# Patient Record
Sex: MALE | Race: WHITE | URBAN - METROPOLITAN AREA
[De-identification: names, ages, dates, MRNs, and addresses within clinical notes are randomized per-mention and may not be internally consistent; named-entity substitution may affect disease eponyms.]

---

## 2023-06-29 ENCOUNTER — OFFICE VISIT (OUTPATIENT)
Dept: URBAN - METROPOLITAN AREA CLINIC 54 | Facility: CLINIC | Age: 23
End: 2023-06-29

## 2023-06-30 ENCOUNTER — DASHBOARD ENCOUNTERS (OUTPATIENT)
Age: 23
End: 2023-06-30

## 2023-06-30 ENCOUNTER — OFFICE VISIT (OUTPATIENT)
Dept: URBAN - METROPOLITAN AREA CLINIC 54 | Facility: CLINIC | Age: 23
End: 2023-06-30
Payer: COMMERCIAL

## 2023-06-30 ENCOUNTER — WEB ENCOUNTER (OUTPATIENT)
Dept: URBAN - METROPOLITAN AREA CLINIC 54 | Facility: CLINIC | Age: 23
End: 2023-06-30

## 2023-06-30 VITALS
HEIGHT: 70 IN | SYSTOLIC BLOOD PRESSURE: 144 MMHG | TEMPERATURE: 97.1 F | BODY MASS INDEX: 28.2 KG/M2 | WEIGHT: 197 LBS | DIASTOLIC BLOOD PRESSURE: 77 MMHG | HEART RATE: 79 BPM

## 2023-06-30 DIAGNOSIS — R11.2 NAUSEA AND VOMITING, UNSPECIFIED VOMITING TYPE: ICD-10-CM

## 2023-06-30 DIAGNOSIS — R11.10 REGURGITATION OF FOOD: ICD-10-CM

## 2023-06-30 PROCEDURE — 99204 OFFICE O/P NEW MOD 45 MIN: CPT | Performed by: INTERNAL MEDICINE

## 2023-06-30 RX ORDER — OMEPRAZOLE 40 MG/1
1 CAPSULE 30 MINUTES BEFORE MORNING MEAL CAPSULE, DELAYED RELEASE ORAL ONCE A DAY
Qty: 30 | Refills: 0 | OUTPATIENT
Start: 2023-06-30

## 2023-06-30 NOTE — HPI-TODAY'S VISIT:
Patient is a 21 yo man self referred for above reasons. He c/o nausea, retching and vomiting x one months. He saw an ENT for nasal stuffiness a months ago. He had an indirect layngoscopy with showed ? LPR. He was prescribed Famotidine 20 mg po daily. His mom mentions he had severe reflux during childhood which got better as he grew up. He has no food intolerances other than eggs which worsen these symptoms. He has symptoms 3 times per week. He has occasional nocturnal symptoms. He admits to regurgitation of food if he lays down after eating but no heartburn. He has frequent throat clearing and mild cough. No history of esophageal surgery.